# Patient Record
(demographics unavailable — no encounter records)

---

## 2024-10-09 NOTE — HISTORY OF PRESENT ILLNESS
[FreeTextEntry1] : pt seen last year her cycles are 27 days long her lmp 10,1,2024 she had a mammo br sono wnl 2024 and pelvic sono  2024 didnt do colonoscopy yet  pt's hypertension controlled with rx

## 2024-10-09 NOTE — PHYSICAL EXAM
[Examination Of The Breasts] : a normal appearance [No Masses] : no breast masses were palpable [Labia Majora] : normal [Labia Minora] : normal [Mass ___ cm] : [unfilled] ~Ucm mass [Normal] : normal [Uterine Adnexae] : normal [FreeTextEntry9] : heme neg [FreeTextEntry8] : appears to have cx polyp or large nabothian cyst

## 2025-01-16 NOTE — PLAN
[FreeTextEntry1] : emb attempted by myself and Dr reagan unable to pass int os sono Dr reagan emt 6 mm fsh and tsh today will watch if continues will book for hysteroscopy

## 2025-01-16 NOTE — HISTORY OF PRESENT ILLNESS
[FreeTextEntry1] : 49 yo [id number] had some midcycle toilet tissue staining this month last TVs 7/24-emt <2 mm